# Patient Record
Sex: MALE | Race: WHITE | URBAN - METROPOLITAN AREA
[De-identification: names, ages, dates, MRNs, and addresses within clinical notes are randomized per-mention and may not be internally consistent; named-entity substitution may affect disease eponyms.]

---

## 2017-01-09 DIAGNOSIS — F41.9 ANXIETY: Primary | ICD-10-CM

## 2017-01-09 RX ORDER — LORAZEPAM 1 MG/1
TABLET ORAL
Qty: 30 TABLET | Refills: 5 | Status: SHIPPED | OUTPATIENT
Start: 2017-01-09

## 2017-03-28 DIAGNOSIS — K76.0 FATTY LIVER: ICD-10-CM

## 2017-03-28 DIAGNOSIS — E78.1 HYPERTRIGLYCERIDEMIA: ICD-10-CM

## 2017-03-28 PROCEDURE — 36415 COLL VENOUS BLD VENIPUNCTURE: CPT | Performed by: FAMILY MEDICINE

## 2017-03-29 LAB
ALBUMIN SERPL-MCNC: 4.6 G/DL (ref 3.5–5.5)
ALP SERPL-CCNC: 21 IU/L (ref 39–117)
ALT SERPL-CCNC: 44 IU/L (ref 0–44)
AST SERPL-CCNC: 26 IU/L (ref 0–40)
BILIRUB SERPL-MCNC: 0.7 MG/DL (ref 0–1.2)
BILIRUBIN, DIRECT: 0.25 MG/DL (ref 0–0.4)
CHOLEST SERPL-MCNC: 178 MG/DL (ref 100–199)
HDLC SERPL-MCNC: 30 MG/DL
LDL/HDL RATIO: 3.7 RATIO UNITS (ref 0–3.6)
LDLC SERPL CALC-MCNC: 112 MG/DL (ref 0–99)
PROT SERPL-MCNC: 7.2 G/DL (ref 6–8.5)
TRIGL SERPL-MCNC: 178 MG/DL (ref 0–149)
VLDLC SERPL CALC-MCNC: 36 MG/DL (ref 5–40)

## 2017-04-17 ENCOUNTER — MYC MEDICAL ADVICE (OUTPATIENT)
Dept: FAMILY MEDICINE | Facility: CLINIC | Age: 32
End: 2017-04-17

## 2017-04-18 NOTE — TELEPHONE ENCOUNTER
Roni, thanks for asking about the physical. You should have a physical once a year in my opinion. That is to monitor the liver and triglyceride problems. You should plan on doing that forever or until some one says otherwise. It is not important that it is exactly once a year. Set up a physical with me for the summer. Hope you are feeling well. The labs looked good last month. Jer Billingsley MD

## 2017-04-18 NOTE — TELEPHONE ENCOUNTER
From: Roni Grace  To: Sabino Braun MD  Sent: 4/17/2017 7:19 PM CDT  Subject: Next visit    When should I come back in for my next checkup?

## 2017-06-26 ENCOUNTER — TELEPHONE (OUTPATIENT)
Dept: FAMILY MEDICINE | Facility: CLINIC | Age: 32
End: 2017-06-26

## 2017-06-26 NOTE — TELEPHONE ENCOUNTER
Fax from [facility] that patients rx for Fenofibrate needs PA.  [place] PA to [place] [employer].  Received fax back stating PA was approved. Approval dates 06/24/2017-06/24/2018.  Called [facility] to inform them of approval.  They will fill and contact patient.

## 2017-07-31 DIAGNOSIS — F41.1 GAD (GENERALIZED ANXIETY DISORDER): ICD-10-CM

## 2017-07-31 RX ORDER — CITALOPRAM HYDROBROMIDE 10 MG/1
10 TABLET ORAL DAILY
Qty: 90 TABLET | Refills: 1 | Status: SHIPPED | OUTPATIENT
Start: 2017-07-31 | End: 2018-02-28

## 2017-07-31 NOTE — TELEPHONE ENCOUNTER
citalopram---last seen 7/6/16 not releated, last addressed 3/29/16---patient is due for an appointment.

## 2017-11-21 DIAGNOSIS — E78.1 HYPERTRIGLYCERIDEMIA: ICD-10-CM

## 2017-11-21 DIAGNOSIS — K76.0 FATTY LIVER: ICD-10-CM

## 2017-11-22 LAB
ALBUMIN SERPL-MCNC: 4.8 G/DL (ref 3.5–5.5)
ALP SERPL-CCNC: 20 IU/L (ref 39–117)
ALT SERPL-CCNC: 71 IU/L (ref 0–44)
AST SERPL-CCNC: 24 IU/L (ref 0–40)
BILIRUB SERPL-MCNC: 0.7 MG/DL (ref 0–1.2)
BILIRUBIN, DIRECT: 0.23 MG/DL (ref 0–0.4)
CHOLEST SERPL-MCNC: 184 MG/DL (ref 100–199)
HDLC SERPL-MCNC: 27 MG/DL
LDL/HDL RATIO: 4.2 RATIO UNITS (ref 0–3.6)
LDLC SERPL CALC-MCNC: 114 MG/DL (ref 0–99)
PROT SERPL-MCNC: 7.4 G/DL (ref 6–8.5)
TRIGL SERPL-MCNC: 214 MG/DL (ref 0–149)
VLDLC SERPL CALC-MCNC: 43 MG/DL (ref 5–40)

## 2017-12-09 DIAGNOSIS — Z76.0 ENCOUNTER FOR MEDICATION REFILL: ICD-10-CM

## 2017-12-09 DIAGNOSIS — E78.1 HYPERTRIGLYCERIDEMIA: Primary | ICD-10-CM

## 2017-12-11 RX ORDER — FENOFIBRATE 160 MG/1
TABLET ORAL
Qty: 30 TABLET | Refills: 1 | Status: SHIPPED | OUTPATIENT
Start: 2017-12-11 | End: 2018-03-05

## 2017-12-11 NOTE — TELEPHONE ENCOUNTER
fenofibrate 160 MG   LAST  Med check 6/1/16   last labs(for RX) 11/21/17  Next  appt scheduled =  none  Dede Castaneda MA      Recent Labs   Lab Test  11/21/17   0909  03/28/17   0906   CHOL  184  178   HDL  27*  30*   LDL  114*  112*   TRIG  214*  178*

## 2018-02-28 DIAGNOSIS — F41.1 GAD (GENERALIZED ANXIETY DISORDER): ICD-10-CM

## 2018-02-28 RX ORDER — CITALOPRAM HYDROBROMIDE 10 MG/1
10 TABLET ORAL DAILY
Qty: 90 TABLET | Refills: 1 | Status: SHIPPED | OUTPATIENT
Start: 2018-02-28 | End: 2018-10-19

## 2018-03-05 DIAGNOSIS — E78.1 HYPERTRIGLYCERIDEMIA: ICD-10-CM

## 2018-03-05 DIAGNOSIS — Z76.0 ENCOUNTER FOR MEDICATION REFILL: ICD-10-CM

## 2018-03-05 RX ORDER — FENOFIBRATE 160 MG/1
160 TABLET ORAL DAILY
Qty: 60 TABLET | Refills: 0 | Status: SHIPPED | OUTPATIENT
Start: 2018-03-05 | End: 2018-04-24

## 2018-04-24 ENCOUNTER — OFFICE VISIT (OUTPATIENT)
Dept: FAMILY MEDICINE | Facility: CLINIC | Age: 33
End: 2018-04-24

## 2018-04-24 VITALS
SYSTOLIC BLOOD PRESSURE: 110 MMHG | WEIGHT: 179.4 LBS | DIASTOLIC BLOOD PRESSURE: 62 MMHG | HEART RATE: 74 BPM | HEIGHT: 71 IN | BODY MASS INDEX: 25.11 KG/M2

## 2018-04-24 DIAGNOSIS — E78.1 HYPERTRIGLYCERIDEMIA: ICD-10-CM

## 2018-04-24 DIAGNOSIS — F41.1 GAD (GENERALIZED ANXIETY DISORDER): ICD-10-CM

## 2018-04-24 DIAGNOSIS — K76.0 FATTY LIVER: Primary | ICD-10-CM

## 2018-04-24 PROCEDURE — 99214 OFFICE O/P EST MOD 30 MIN: CPT | Performed by: FAMILY MEDICINE

## 2018-04-24 PROCEDURE — 36415 COLL VENOUS BLD VENIPUNCTURE: CPT | Performed by: FAMILY MEDICINE

## 2018-04-24 RX ORDER — FENOFIBRATE 160 MG/1
160 TABLET ORAL DAILY
Qty: 90 TABLET | Refills: 3 | Status: SHIPPED | OUTPATIENT
Start: 2018-04-24 | End: 2019-05-20

## 2018-04-24 NOTE — PROGRESS NOTES
"Problem(s) Oriented visit        SUBJECTIVE:                                                    Roni Grace is a 33 year old male who presents to clinic today for recheck of cholesterol and fatty liver disease. He is fasting. He notes that his stools are lighter in color and is sometimes constipated and can infrequently have bloating. He drinks estimated 15 beers weekly.     He takes citalopram for anxiety and is trying to get off. He previously had panic attacks but denies any currently. At this point he would like to stop.     Problem list, Medication list, Allergies, and Medical/Social/Surgical histories reviewed in EPIC and updated as appropriate.   Additional history: as documented    ROS:  5 point ROS completed and negative except noted above, including Gen, CV, Resp, GI, MS      Histories:   Patient Active Problem List   Diagnosis     Anxiety     Health Care Home     Snoring     Hypertriglyceridemia     Fatty liver     History reviewed. No pertinent surgical history.    Social History   Substance Use Topics     Smoking status: Never Smoker     Smokeless tobacco: Never Used     Alcohol use 12.0 oz/week     20 Standard drinks or equivalent per week     History reviewed. No pertinent family history.        OBJECTIVE:                                                    /62  Pulse 74  Ht 1.803 m (5' 11\")  Wt 81.4 kg (179 lb 6.4 oz)  BMI 25.02 kg/m2  Body mass index is 25.02 kg/(m^2).   GENERAL APPEARANCE: Alert, no acute distress  NECK: No adenopathy,masses or thyromegaly  RESP: lungs clear to auscultation   CV: normal rate, regular rhythm, no murmur or gallop  ABDOMEN: soft, no organomegaly, masses or tenderness  MS: extremities normal, no peripheral edema  NEURO: Alert, oriented, speech and mentation normal  PSYCH: mentation appears normal, affect and mood normal   Labs Resulted Today:   Results for orders placed or performed in visit on 11/21/17   Hepatic Function Panel (7) ([facility])   Result Value " Ref Range    Protein Total 7.4 6.0 - 8.5 g/dL    Albumin 4.8 3.5 - 5.5 g/dL    Bilirubin Total 0.7 0.0 - 1.2 mg/dL    Bilirubin Direct 0.23 0.00 - 0.40 mg/dL    Alkaline Phosphatase 20 (L) 39 - 117 IU/L    AST 24 0 - 40 IU/L    ALT 71 (H) 0 - 44 IU/L    Narrative    Performed at:  01 - [facility] [address], [place], [place]  [id number]  : Josesito Basilio MD, Phone:  [phone]   Lipid Panel ([facility])   Result Value Ref Range    Cholesterol 184 100 - 199 mg/dL    Triglycerides 214 (H) 0 - 149 mg/dL    HDL Cholesterol 27 (L) >39 mg/dL    VLDL Cholesterol Markos 43 (H) 5 - 40 mg/dL    LDL Cholesterol Calculated 114 (H) 0 - 99 mg/dL    LDL/HDL Ratio 4.2 (H) 0.0 - 3.6 ratio units    Narrative    Performed at:  01 - [facility] [address], [place], [place]  [id number]  : Josesito Basilio MD, Phone:  [phone]     ASSESSMENT/PLAN:                                                        Roni was seen today for recheck medication.    Diagnoses and all orders for this visit:    Fatty liver  -     Hepatic Function Panel (7) ([facility])  Recommend decreasing beer by a third. If still with elevated liver enzymes after that he may need to decrease further. He is also instructed to add an exercise program, even if it is broken up into 10-15 minute segments 2-3 times daily rather than all at once.     Hypertriglyceridemia  -     fenofibrate 160 MG tablet; Take 1 tablet (160 mg) by mouth daily  -     Lipid Panel ([facility])  -     Hepatic Function Panel (7) ([facility])    Generalized Anxiety  He is given option of staying on low dose citalopram or weaning further. He elects to try citalopram 5 mg daily for a month and then stopping. Recommend exercise to help with anxiety as well. He is told about the book Chemistry of Calm which also may be helpful.    There are no Patient Instructions on file for this visit.    The following health maintenance items are reviewed in Epic and correct as of today:  Health  Maintenance   Topic Date Due     TETANUS IMMUNIZATION (SYSTEM ASSIGNED)  07/21/2020     HIV SCREEN (SYSTEM ASSIGNED)  Addressed     INFLUENZA VACCINE  Addressed       Emilie Polk MD  [facility]  Family Practice  [facility]  [phone]    For any issues my office # is [phone]

## 2018-04-24 NOTE — MR AVS SNAPSHOT
"              After Visit Summary   4/24/2018    Roni Grace    MRN: [id number]           Patient Information     Date Of Birth          1985        Visit Information        Provider Department      4/24/2018 8:30 AM Emilie Polk MD [facility]        Today's Diagnoses     Fatty liver    -  1    Hypertriglyceridemia        PHIL (generalized anxiety disorder)           Follow-ups after your visit        Who to contact     If you have questions or need follow up information about today's clinic visit or your schedule please contact [facility] directly at [phone].  Normal or non-critical lab and imaging results will be communicated to you by Brainrackhart, letter or phone within 4 business days after the clinic has received the results. If you do not hear from us within 7 days, please contact the clinic through [employer] or phone. If you have a critical or abnormal lab result, we will notify you by phone as soon as possible.  Submit refill requests through [employer] or call your pharmacy and they will forward the refill request to us. Please allow 3 business days for your refill to be completed.          Additional Information About Your Visit        MyChart Information     [employer] gives you secure access to your electronic health record. If you see a primary care provider, you can also send messages to your care team and make appointments. If you have questions, please call your primary care clinic.  If you do not have a primary care provider, please call [phone] and they will assist you.        Care EveryWhere ID     This is your Care EveryWhere ID. This could be used by other organizations to access your [facility] medical records  [id number]        Your Vitals Were     Pulse Height BMI (Body Mass Index)             74 1.803 m (5' 11\") 25.02 kg/m2          Blood Pressure from Last 3 Encounters:   04/24/18 110/62   07/06/16 128/78   06/01/16 124/78    Weight from Last 3 " Encounters:   04/24/18 81.4 kg (179 lb 6.4 oz)   07/06/16 83.3 kg (183 lb 9.6 oz)   06/01/16 81.7 kg (180 lb 3.2 oz)              We Performed the Following     Hepatic Function Panel (7) ([facility])     Lipid Panel ([facility])          Where to get your medicines      These medications were sent to [id number] - [place] [address] AT [address]  [address], [facility] [address]     Phone:  [phone]     fenofibrate 160 MG tablet          Primary Care Provider Office Phone # Fax #    Emilie Polk [phone] [address] [place] [address]        Equal Access to Services     CARLOS SON : Hadii percy pierce hadasho Soimchael, waaxda luqadaha, qaybta kaalmada adeegyada, kamlesh foley. So [facility] [phone].    ATENCIÓN: Si habla español, tiene a luz disposición servicios gratuitos de asistencia lingüística. Sudha al [phone].    We comply with applicable federal civil rights laws and [place] laws. We do not discriminate on the basis of race, color, national origin, age, disability, sex, sexual orientation, or gender identity.            Thank you!     Thank you for choosing [facility]  for your care. Our goal is always to provide you with excellent care. Hearing back from our patients is one way we can continue to improve our services. Please take a few minutes to complete the written survey that you may receive in the mail after your visit with us. Thank you!             Your Updated Medication List - Protect others around you: Learn how to safely use, store and throw away your medicines at www.disposemymeds.org.          This list is accurate as of 4/24/18  9:30 AM.  Always use your most recent med list.                   Brand Name Dispense Instructions for use Diagnosis    citalopram 10 MG tablet    celeXA    90 tablet    Take 1 tablet (10 mg) by mouth daily    HPIL (generalized  anxiety disorder)       DAILY MULTI PO      Take by mouth daily        fenofibrate 160 MG tablet     90 tablet    Take 1 tablet (160 mg) by mouth daily    Hypertriglyceridemia       LORazepam 1 MG tablet    ATIVAN    30 tablet    TAKE 0.5 - 1 TABLETS BY MOUTH EVERY 8 HOURS AS NEEDED FOR ANXIETY. TAKE 30 MINUTES PRIOR TO DEPARTURE. DO NOT OPERATE A VEHICLE AFTER TAKING    Anxiety       magnesium 250 MG tablet      Take 1 tablet by mouth daily

## 2018-04-25 LAB
ALBUMIN SERPL-MCNC: 4.9 G/DL (ref 3.5–5.5)
ALP SERPL-CCNC: 19 IU/L (ref 39–117)
ALT SERPL-CCNC: 47 IU/L (ref 0–44)
AST SERPL-CCNC: 22 IU/L (ref 0–40)
BILIRUB SERPL-MCNC: 0.4 MG/DL (ref 0–1.2)
BILIRUBIN, DIRECT: 0.17 MG/DL (ref 0–0.4)
CHOLEST SERPL-MCNC: 161 MG/DL (ref 100–199)
HDLC SERPL-MCNC: 29 MG/DL
LDL/HDL RATIO: 3.1 RATIO (ref 0–3.6)
LDLC SERPL CALC-MCNC: 89 MG/DL (ref 0–99)
PROT SERPL-MCNC: 7.4 G/DL (ref 6–8.5)
TRIGL SERPL-MCNC: 216 MG/DL (ref 0–149)
VLDLC SERPL CALC-MCNC: 43 MG/DL (ref 5–40)

## 2018-04-25 ASSESSMENT — PATIENT HEALTH QUESTIONNAIRE - PHQ9: SUM OF ALL RESPONSES TO PHQ QUESTIONS 1-9: 3

## 2018-07-17 ENCOUNTER — TELEPHONE (OUTPATIENT)
Dept: FAMILY MEDICINE | Facility: CLINIC | Age: 33
End: 2018-07-17

## 2018-07-17 NOTE — TELEPHONE ENCOUNTER
Received call from patient that his PA for fenofibrate will  .  Wants new PA done.  [place] PA to [place] [employer] by fax.  Will wait for response.

## 2018-07-19 NOTE — TELEPHONE ENCOUNTER
Received fax from [employer] that patients PA for fenofibrate was approved.  Approval dates 07/03/2018-07/03/2019.  Called patient to inform him of approval.

## 2018-10-19 DIAGNOSIS — F41.1 GAD (GENERALIZED ANXIETY DISORDER): ICD-10-CM

## 2018-10-19 RX ORDER — CITALOPRAM HYDROBROMIDE 10 MG/1
10 TABLET ORAL DAILY
Qty: 90 TABLET | Refills: 0 | Status: SHIPPED | OUTPATIENT
Start: 2018-10-19 | End: 2018-11-20

## 2018-11-20 ENCOUNTER — OFFICE VISIT (OUTPATIENT)
Dept: FAMILY MEDICINE | Facility: CLINIC | Age: 33
End: 2018-11-20

## 2018-11-20 VITALS
WEIGHT: 174 LBS | BODY MASS INDEX: 24.27 KG/M2 | RESPIRATION RATE: 16 BRPM | DIASTOLIC BLOOD PRESSURE: 70 MMHG | SYSTOLIC BLOOD PRESSURE: 124 MMHG | OXYGEN SATURATION: 98 % | HEART RATE: 75 BPM

## 2018-11-20 DIAGNOSIS — E78.1 HYPERTRIGLYCERIDEMIA: ICD-10-CM

## 2018-11-20 DIAGNOSIS — E78.6 LOW HDL (UNDER 40): ICD-10-CM

## 2018-11-20 DIAGNOSIS — F41.1 GAD (GENERALIZED ANXIETY DISORDER): ICD-10-CM

## 2018-11-20 DIAGNOSIS — Z23 NEED FOR PROPHYLACTIC VACCINATION AND INOCULATION AGAINST INFLUENZA: Primary | ICD-10-CM

## 2018-11-20 DIAGNOSIS — R94.5 ABNORMAL RESULTS OF LIVER FUNCTION STUDIES: ICD-10-CM

## 2018-11-20 PROCEDURE — 99214 OFFICE O/P EST MOD 30 MIN: CPT | Mod: 25 | Performed by: FAMILY MEDICINE

## 2018-11-20 PROCEDURE — 90471 IMMUNIZATION ADMIN: CPT | Performed by: FAMILY MEDICINE

## 2018-11-20 PROCEDURE — 90686 IIV4 VACC NO PRSV 0.5 ML IM: CPT | Performed by: FAMILY MEDICINE

## 2018-11-20 RX ORDER — CITALOPRAM HYDROBROMIDE 10 MG/1
10 TABLET ORAL DAILY
Qty: 90 TABLET | Refills: 3 | Status: SHIPPED | OUTPATIENT
Start: 2018-11-20 | End: 2019-12-14

## 2018-11-20 NOTE — PROGRESS NOTES
Injectable Influenza Immunization Documentation    1.  Is the person to be vaccinated sick today?   No    2. Does the person to be vaccinated have an allergy to a component   of the vaccine?   No  Egg Allergy Algorithm Link    3. Has the person to be vaccinated ever had a serious reaction   to influenza vaccine in the past?   No    4. Has the person to be vaccinated ever had Guillain-Barré syndrome?   No    Form completed by Latasha Amador CMA         Problem(s) Oriented visit        SUBJECTIVE:                                                    Roni Grace is a 33 year old male who presents to clinic today for recheck of cholesterol. He had fasting lipid panel last spring with a low HDL (29) and elevated triglyceride (216). He is getting more exercise with longer walks. He is also making an effort to drink less alcohol. He estimates 4 beverages weekly. He has had elevated ALT in the past.      Problem list, Medication list, Allergies, and Medical/Social/Surgical histories reviewed in [facility] and updated as appropriate.   Additional history: as documented    ROS:  5 point ROS completed and negative except noted above, including Gen, CV, Resp, GI, MS      Histories:   Patient Active Problem List   Diagnosis     Health Care Home     Snoring     Hypertriglyceridemia     PHIL (generalized anxiety disorder)     Low HDL (under 40)     Abnormal results of liver function studies     Past Surgical History:   Procedure Laterality Date     HC TOOTH EXTRACTION W/FORCEP       wisdom teeth         Social History   Substance Use Topics     Smoking status: Never Smoker     Smokeless tobacco: Never Used     Alcohol use 2.4 oz/week     4 Standard drinks or equivalent per week     Family History   Problem Relation Age of Onset     Liver Disease Mother      fatty liver     Liver Disease Brother      fatty liver           OBJECTIVE:                                                    /70  Pulse 75  Resp 16  Wt 78.9 kg (174 lb)   SpO2 98%  BMI 24.27 kg/m2  Body mass index is 24.27 kg/(m^2).   GENERAL APPEARANCE: Alert, no acute distress  NECK: No adenopathy,masses or thyromegaly  RESP: lungs clear to auscultation   CV: normal rate, regular rhythm, no murmur or gallop  MS: extremities normal, no peripheral edema  NEURO: Alert, oriented, speech and mentation normal  PSYCH: mentation appears normal, affect and mood normal   Labs Resulted Today:   Results for orders placed or performed in visit on 04/24/18   Lipid Panel ([facility])   Result Value Ref Range    Cholesterol 161 100 - 199 mg/dL    Triglycerides 216 (H) 0 - 149 mg/dL    HDL Cholesterol 29 (L) >39 mg/dL    VLDL Cholesterol Markos 43 (H) 5 - 40 mg/dL    LDL Cholesterol Calculated 89 0 - 99 mg/dL    LDL/HDL Ratio 3.1 0.0 - 3.6 ratio    Narrative    Performed at:  01 - [facility] [address], [place], [place]  [id number]  : Josesito Basilio MD, Phone:  [phone]   Hepatic Function Panel (7) ([facility])   Result Value Ref Range    Protein Total 7.4 6.0 - 8.5 g/dL    Albumin 4.9 3.5 - 5.5 g/dL    Bilirubin Total 0.4 0.0 - 1.2 mg/dL    Bilirubin Direct 0.17 0.00 - 0.40 mg/dL    Alkaline Phosphatase 19 (L) 39 - 117 IU/L    AST 22 0 - 40 IU/L    ALT 47 (H) 0 - 44 IU/L    Narrative    Performed at:  01 - [facility] [address], [place], [place]  [id number]  : Josesito Basilio MD, Phone:  [phone]     ASSESSMENT/PLAN:                                                        Roni was seen today for recheck medication and flu shot.    Diagnoses and all orders for this visit:    Need for prophylactic vaccination and inoculation against influenza  -     FLU VACCINE, SPLIT VIRUS, IM (QUADRIVALENT) [07476]- >3 YRS  -     Vaccine Administration, Initial [55871]    Hypertriglyceridemia  -     Lipid Panel ([facility])    Low HDL (under 40)  -     Lipid Panel ([facility])  Continue fenofibrate along with low saturated fat diet and regular exercise.     Abnormal results of liver  function studies  -     Hepatic Function Panel (7) ([facility])  Continue to use alcohol modestly and avoid large doses of acetaminophen.     PHIL (generalized anxiety disorder)  -     citalopram (CELEXA) 10 MG tablet; Take 1 tablet (10 mg) by mouth daily  Doing well, continue citalopram.      There are no Patient Instructions on file for this visit.    The following health maintenance items are reviewed in Epic and correct as of today:  Health Maintenance   Topic Date Due     INFLUENZA VACCINE (1) 09/01/2018     PHQ-2 Q1 YR  04/24/2019     TETANUS IMMUNIZATION (SYSTEM ASSIGNED)  07/21/2020     HIV SCREEN (SYSTEM ASSIGNED)  Addressed       Emilie Polk MD  [facility]  Family Practice  [facility]  [phone]    For any issues my office # is [phone]

## 2018-11-20 NOTE — MR AVS SNAPSHOT
After Visit Summary   11/20/2018    Roni Grace    MRN: [id number]           Patient Information     Date Of Birth          1985        Visit Information        Provider Department      11/20/2018 8:45 AM Emilie Polk MD [facility]        Today's Diagnoses     Need for prophylactic vaccination and inoculation against influenza    -  1    Hypertriglyceridemia        Low HDL (under 40)        Abnormal results of liver function studies        PHIL (generalized anxiety disorder)           Follow-ups after your visit        Who to contact     If you have questions or need follow up information about today's clinic visit or your schedule please contact [facility] directly at [phone].  Normal or non-critical lab and imaging results will be communicated to you by MyChart, letter or phone within 4 business days after the clinic has received the results. If you do not hear from us within 7 days, please contact the clinic through [employer] Deck[employer] or phone. If you have a critical or abnormal lab result, we will notify you by phone as soon as possible.  Submit refill requests through [employer] or call your pharmacy and they will forward the refill request to us. Please allow 3 business days for your refill to be completed.          Additional Information About Your Visit        MyChart Information     [employer] gives you secure access to your electronic health record. If you see a primary care provider, you can also send messages to your care team and make appointments. If you have questions, please call your primary care clinic.  If you do not have a primary care provider, please call [phone] and they will assist you.        Care EveryWhere ID     This is your Care EveryWhere ID. This could be used by other organizations to access your [place] medical records  [id number]        Your Vitals Were     Pulse Respirations Pulse Oximetry BMI (Body Mass Index)          75 16 98%  24.27 kg/m2         Blood Pressure from Last 3 Encounters:   11/20/18 124/70   04/24/18 110/62   07/06/16 128/78    Weight from Last 3 Encounters:   11/20/18 78.9 kg (174 lb)   04/24/18 81.4 kg (179 lb 6.4 oz)   07/06/16 83.3 kg (183 lb 9.6 oz)              We Performed the Following     FLU VACCINE, SPLIT VIRUS, IM (QUADRIVALENT) [69418]- >3 YRS     Hepatic Function Panel (7) ([facility])     Lipid Panel ([facility])     Vaccine Administration, Initial [69956]          Where to get your medicines      These medications were sent to [facility] [address] AT [facility] [address] [facility] [address]     Phone:  [phone]     citalopram 10 MG tablet          Primary Care Provider Office Phone # Fax #    Emilie Miley Polk [phone] [address] [place] [address]        Equal Access to Services     [facility] AH: Hadii percy ku hadasho Soomaali, waaxda luqadaha, qaybta kaalmada adeegyada, kamlesh terrell . So [place] [phone].    ATENCIÓN: Si habla español, tiene a luz disposición servicios gratuitos de asistencia lingüística. [facility] [phone].    We comply with applicable federal civil rights laws and [place] laws. We do not discriminate on the basis of race, color, national origin, age, disability, sex, sexual orientation, or gender identity.            Thank you!     Thank you for choosing [facility]  for your care. Our goal is always to provide you with excellent care. Hearing back from our patients is one way we can continue to improve our services. Please take a few minutes to complete the written survey that you may receive in the mail after your visit with us. Thank you!             Your Updated Medication List - Protect others around you: Learn how to safely use, store and throw away your medicines at www.disposemymeds.org.          This list is accurate as of 11/20/18   4:38 PM.  Always use your most recent med list.                   Brand Name Dispense Instructions for use Diagnosis    citalopram 10 MG tablet    celeXA    90 tablet    Take 1 tablet (10 mg) by mouth daily    PHIL (generalized anxiety disorder)       DAILY MULTI PO      Take by mouth daily        fenofibrate 160 MG tablet     90 tablet    Take 1 tablet (160 mg) by mouth daily    Hypertriglyceridemia       LORazepam 1 MG tablet    ATIVAN    30 tablet    TAKE 0.5 - 1 TABLETS BY MOUTH EVERY 8 HOURS AS NEEDED FOR ANXIETY. TAKE 30 MINUTES PRIOR TO DEPARTURE. DO NOT OPERATE A VEHICLE AFTER TAKING    Anxiety       magnesium 250 MG tablet      Take 1 tablet by mouth daily

## 2018-11-21 LAB
ALBUMIN SERPL-MCNC: 4.9 G/DL (ref 3.5–5.5)
ALP SERPL-CCNC: 15 IU/L (ref 39–117)
ALT SERPL-CCNC: 62 IU/L (ref 0–44)
AST SERPL-CCNC: 31 IU/L (ref 0–40)
BILIRUB SERPL-MCNC: 0.4 MG/DL (ref 0–1.2)
BILIRUBIN, DIRECT: 0.18 MG/DL (ref 0–0.4)
CHOLEST SERPL-MCNC: 147 MG/DL (ref 100–199)
HDLC SERPL-MCNC: 34 MG/DL
LDL/HDL RATIO: 2.6 RATIO (ref 0–3.6)
LDLC SERPL CALC-MCNC: 90 MG/DL (ref 0–99)
PROT SERPL-MCNC: 7.3 G/DL (ref 6–8.5)
TRIGL SERPL-MCNC: 114 MG/DL (ref 0–149)
VLDLC SERPL CALC-MCNC: 23 MG/DL (ref 5–40)

## 2019-05-20 DIAGNOSIS — E78.1 HYPERTRIGLYCERIDEMIA: ICD-10-CM

## 2019-05-20 RX ORDER — FENOFIBRATE 160 MG/1
160 TABLET ORAL DAILY
Qty: 90 TABLET | Refills: 1 | Status: SHIPPED | OUTPATIENT
Start: 2019-05-20 | End: 2019-11-14

## 2019-05-20 NOTE — TELEPHONE ENCOUNTER
Finofibrate  LOV & Labs 11/20/18    Lab Results   Component Value Date    CHOL 147 11/20/2018    CHOL 161 04/24/2018     Lab Results   Component Value Date    HDL 34 11/20/2018    HDL 29 04/24/2018     Lab Results   Component Value Date    LDL 90 11/20/2018    LDL 89 04/24/2018     Lab Results   Component Value Date    TRIG 114 11/20/2018    TRIG 216 04/24/2018

## 2019-11-09 ENCOUNTER — HEALTH MAINTENANCE LETTER (OUTPATIENT)
Age: 34
End: 2019-11-09

## 2020-12-06 ENCOUNTER — HEALTH MAINTENANCE LETTER (OUTPATIENT)
Age: 35
End: 2020-12-06

## 2021-09-26 ENCOUNTER — HEALTH MAINTENANCE LETTER (OUTPATIENT)
Age: 36
End: 2021-09-26

## 2022-01-15 ENCOUNTER — HEALTH MAINTENANCE LETTER (OUTPATIENT)
Age: 37
End: 2022-01-15

## 2023-04-23 ENCOUNTER — HEALTH MAINTENANCE LETTER (OUTPATIENT)
Age: 38
End: 2023-04-23